# Patient Record
Sex: FEMALE | Race: WHITE | NOT HISPANIC OR LATINO | URBAN - METROPOLITAN AREA
[De-identification: names, ages, dates, MRNs, and addresses within clinical notes are randomized per-mention and may not be internally consistent; named-entity substitution may affect disease eponyms.]

---
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
## 2025-01-06 ENCOUNTER — HOSPITAL ENCOUNTER (OUTPATIENT)
Dept: RADIOLOGY | Facility: HOSPITAL | Age: 20
Discharge: HOME | End: 2025-01-06
Payer: COMMERCIAL

## 2025-01-06 ENCOUNTER — APPOINTMENT (OUTPATIENT)
Dept: PEDIATRICS | Facility: CLINIC | Age: 20
End: 2025-01-06
Payer: COMMERCIAL

## 2025-01-06 VITALS
OXYGEN SATURATION: 97 % | HEART RATE: 92 BPM | TEMPERATURE: 98.4 F | HEIGHT: 63 IN | DIASTOLIC BLOOD PRESSURE: 82 MMHG | SYSTOLIC BLOOD PRESSURE: 118 MMHG | BODY MASS INDEX: 25.23 KG/M2 | WEIGHT: 142.38 LBS

## 2025-01-06 DIAGNOSIS — F41.1 GENERALIZED ANXIETY DISORDER: ICD-10-CM

## 2025-01-06 DIAGNOSIS — F93.0 SEPARATION ANXIETY DISORDER: ICD-10-CM

## 2025-01-06 DIAGNOSIS — F41.1 GAD (GENERALIZED ANXIETY DISORDER): ICD-10-CM

## 2025-01-06 DIAGNOSIS — Z51.81 ENCOUNTER FOR MEDICATION MONITORING: ICD-10-CM

## 2025-01-06 DIAGNOSIS — F32.A DEPRESSIVE DISORDER: ICD-10-CM

## 2025-01-06 DIAGNOSIS — F41.1 GENERALIZED ANXIETY DISORDER WITH PANIC ATTACKS: ICD-10-CM

## 2025-01-06 DIAGNOSIS — M25.572 ACUTE LEFT ANKLE PAIN: Primary | ICD-10-CM

## 2025-01-06 DIAGNOSIS — F93.8 ANXIETY DISORDER OF ADOLESCENCE: ICD-10-CM

## 2025-01-06 DIAGNOSIS — F41.0 GENERALIZED ANXIETY DISORDER WITH PANIC ATTACKS: ICD-10-CM

## 2025-01-06 DIAGNOSIS — Z51.81 MEDICATION MONITORING ENCOUNTER: ICD-10-CM

## 2025-01-06 DIAGNOSIS — M85.679 BONE CYST OF ANKLE: ICD-10-CM

## 2025-01-06 DIAGNOSIS — Z76.89 ENCOUNTER FOR NEW MEDICATION PRESCRIPTION: ICD-10-CM

## 2025-01-06 DIAGNOSIS — M25.572 ACUTE LEFT ANKLE PAIN: ICD-10-CM

## 2025-01-06 PROCEDURE — 73610 X-RAY EXAM OF ANKLE: CPT | Mod: LT

## 2025-01-06 PROCEDURE — 73610 X-RAY EXAM OF ANKLE: CPT | Mod: LEFT SIDE | Performed by: RADIOLOGY

## 2025-01-06 PROCEDURE — 96127 BRIEF EMOTIONAL/BEHAV ASSMT: CPT | Performed by: PEDIATRICS

## 2025-01-06 PROCEDURE — 99214 OFFICE O/P EST MOD 30 MIN: CPT | Performed by: PEDIATRICS

## 2025-01-06 PROCEDURE — 3008F BODY MASS INDEX DOCD: CPT | Performed by: PEDIATRICS

## 2025-01-06 RX ORDER — ESCITALOPRAM OXALATE 5 MG/1
5 TABLET ORAL DAILY
Qty: 90 TABLET | Refills: 1 | Status: SHIPPED | OUTPATIENT
Start: 2025-01-06 | End: 2025-07-05

## 2025-01-06 NOTE — PROGRESS NOTES
Subjective   Patient ID: Naomy Sutton is a 19 y.o. female who presents for Well Child (Patient is here for 19 year old well child concerns for left ankle pain and no concerns for anxiety.)    HPI      HERE FOR FOLLOW UP FOR ANXIETY , concern for left ankle pain   -previous diagnosis by other providers for: General Anxiety Disorder, Adolescent Depression, Eating disorder, Self cutting behaviors (last done 2020)   -2/28/2023: 1st diagnosis in office by me; medication for anxiety started  -1/5/2024: was on lexapro 10 mg with counseling  -7/5/2024: patient desired to wean off medication = decreased to 5 mg for 1 month  -8/30/2024: telehealth visit: did not tolerate being off medication for 20 days = patient decided to restart lexapro 5 mg   -9/27/2024: telehealth follow up: improved on lexapro 5 mg    Since last seen, no counseling  Sleep is good  Appetite is unchanged.   When home, has more steady.     Anxiety:   Some breakthrough episodes  Friend group, lost friends.   Old roommate not now; 2 roommates, still present in home.   Other room mate she gets along .  No change with relationship     Triggers: when others stress, will stress  Will go on walks at least 3 days/week   No other substance abuse    Happy with current dose.   Change in birth control due to uncontrolled periods.  Was on progesterone only, now on high dose progrestone with low dose estrogen with iron.     Plan to continue until summer.       2. Ankle pain for over 1 year   1 year during running started and twisted same ankle  Patient was not evaluated by clinic or with xray   Since then, pain comes and goes but pretty consistent  Friend of family who is physical therapist recommended she be evaluated for possible displaced talus     Few months ago with walking, twisted ankle and rolled inward  Pain all the time, attempting stretching with band with minimal improvement.   Some bruising when walking excessively.   Pain dailly   Pain on top of  "ankle to lateral malleolus, pain worse with weight bearing.   No physical therapy yet.   No weakness or numbness                          Review of Systems    Vitals:    01/06/25 1304   BP: 118/82   Pulse: 92   Temp: 36.9 °C (98.4 °F)   SpO2: 97%   Weight: 64.6 kg (142 lb 6 oz)   Height: 1.6 m (5' 3\")       Objective   Physical Exam  Vitals and nursing note reviewed. Exam conducted with a chaperone present.   Constitutional:       General: She is not in acute distress.     Appearance: Normal appearance. She is well-developed. She is not toxic-appearing.   HENT:      Head: Normocephalic and atraumatic.      Right Ear: Tympanic membrane and external ear normal.      Left Ear: Tympanic membrane and external ear normal.      Nose: Nose normal.      Mouth/Throat:      Mouth: Mucous membranes are moist.      Pharynx: Oropharynx is clear. No oropharyngeal exudate or posterior oropharyngeal erythema.      Tonsils: No tonsillar exudate.   Eyes:      Extraocular Movements: Extraocular movements intact.      Conjunctiva/sclera: Conjunctivae normal.   Cardiovascular:      Rate and Rhythm: Normal rate and regular rhythm.      Pulses: Normal pulses.      Heart sounds: Normal heart sounds. No murmur heard.  Pulmonary:      Effort: Pulmonary effort is normal.      Breath sounds: Normal breath sounds.   Abdominal:      General: Abdomen is flat. Bowel sounds are normal.      Palpations: Abdomen is soft.   Musculoskeletal:      Cervical back: Neck supple.      Right knee: Normal.      Left knee: Normal.      Right lower leg: Normal. No swelling. No edema.      Left lower leg: Normal. No swelling. No edema.      Right ankle: Normal.      Right Achilles Tendon: Normal.      Left ankle: No swelling, deformity or ecchymosis. Tenderness present. Normal range of motion.      Left Achilles Tendon: Normal.      Comments: Tenderness on left lateral malleolus and just behind malleolus    Lymphadenopathy:      Cervical: No cervical adenopathy. "   Skin:     General: Skin is warm and dry.      Findings: No rash.   Neurological:      Mental Status: She is alert. Mental status is at baseline.   Psychiatric:         Mood and Affect: Mood normal. Mood is not depressed. Affect is not flat.         Speech: Speech normal.         Behavior: Behavior normal. Behavior is cooperative.                  Assessment/Plan   Problem List Items Addressed This Visit       Anxiety disorder of adolescence    Relevant Medications    escitalopram (Lexapro) 5 mg tablet    ARVIND (generalized anxiety disorder)    Relevant Medications    escitalopram (Lexapro) 5 mg tablet    Depressive disorder    Relevant Medications    escitalopram (Lexapro) 5 mg tablet    Generalized anxiety disorder    Relevant Medications    escitalopram (Lexapro) 5 mg tablet     Other Visit Diagnoses       Acute left ankle pain    -  Primary    Relevant Orders    XR ankle left 3+ views    Encounter for medication monitoring        Relevant Medications    escitalopram (Lexapro) 5 mg tablet    Generalized anxiety disorder with panic attacks        Relevant Medications    escitalopram (Lexapro) 5 mg tablet    Separation anxiety disorder        Relevant Medications    escitalopram (Lexapro) 5 mg tablet    Medication monitoring encounter        Relevant Medications    escitalopram (Lexapro) 5 mg tablet    Encounter for new medication prescription        Relevant Medications    escitalopram (Lexapro) 5 mg tablet              Current Outpatient Medications:     escitalopram (Lexapro) 5 mg tablet, Take 1 tablet (5 mg) by mouth once daily., Disp: 30 tablet, Rfl: 0    escitalopram (Lexapro) 5 mg tablet, Take 1 tablet (5 mg) by mouth once daily., Disp: 90 tablet, Rfl: 1    hydrOXYzine pamoate (Vistaril) 25 mg capsule, Take 1-2 tablets up to twice  a day as needed for anxiety, Disp: 30 capsule, Rfl: 1      MDM  General anxiety and depression controlled on escitalopram 5 mg, failed trial off medication     PHQ-A: see scanned  form; Total 3; A/P: low risk, no referrals      Anxiety screen : see scanned scored form completed by patient  Panic disorder score(7+ is positive):  Patient 3  General Anxiety Disorder score(9+ is positive): Patient 9  Separation Anxeity Disorder score(5+ is positive):  Patient 0  Social Anxiety Disorder score(8+ is positive):  Patient 2  Significant School Avoidance score(25+ is positive):  Patient 0  Total: Patient 14     Interpretation Guidelines:   A total score of >25 may indicate the presence of Anxiety Disorder. Scores higher than 30 are more specific.     Assessment: scores consistent with controlled Anxiety   Depression score mild depression   No suicidal risks or ideations   No other risk factors       Counseling: none  Reviewed anxiety and depression diagnosis, course, importance of taking medication daily  Refill rx; escitalopram 5 mg, #90 supply sent to mail order, rf 1   Patient has hydroxyzine 25 mg prn break through anxiety   Follow up in summer  Plan: trial to slowly wean off medication.   Return prn any worse    2. Left ankle pain intermittently x 1 year with new injury   Discussed suspected diagnosis, treatment, course with patient  Given time of pain, recommend imaging to further evaluate  If normal:   Supportive care: RICE, rest, ice to area 10 min intervals, nsaids prn pain, compressive dressing for support  Return prn any worse   Consider physical therapy if xray normal     Alexandra Faye MD

## 2025-01-10 NOTE — PROGRESS NOTES
XRAY reviewed   Impression 5 mm cystic bone lesion with sclerotic rim in the distal diaphysis of fibula, suspect incidental bone cyst     Assessment and plan   Message to be sent to patient and parent regarding results  Recommend even if benign bone cyst, recommend follow up with Peds Ortho since patient with chronic ankle pain.     Alexandra Faye MD

## 2025-02-21 DIAGNOSIS — F41.1 GENERALIZED ANXIETY DISORDER WITH PANIC ATTACKS: ICD-10-CM

## 2025-02-21 DIAGNOSIS — Z76.89 ENCOUNTER FOR NEW MEDICATION PRESCRIPTION: ICD-10-CM

## 2025-02-21 DIAGNOSIS — F32.A DEPRESSIVE DISORDER: ICD-10-CM

## 2025-02-21 DIAGNOSIS — F93.0 SEPARATION ANXIETY DISORDER: ICD-10-CM

## 2025-02-21 DIAGNOSIS — Z51.81 MEDICATION MONITORING ENCOUNTER: ICD-10-CM

## 2025-02-21 DIAGNOSIS — F93.8 ANXIETY DISORDER OF ADOLESCENCE: ICD-10-CM

## 2025-02-21 DIAGNOSIS — Z51.81 ENCOUNTER FOR MEDICATION MONITORING: ICD-10-CM

## 2025-02-21 DIAGNOSIS — F41.1 GENERALIZED ANXIETY DISORDER: ICD-10-CM

## 2025-02-21 DIAGNOSIS — F41.0 GENERALIZED ANXIETY DISORDER WITH PANIC ATTACKS: ICD-10-CM

## 2025-02-21 DIAGNOSIS — F41.1 GAD (GENERALIZED ANXIETY DISORDER): ICD-10-CM

## 2025-02-21 NOTE — TELEPHONE ENCOUNTER
SPOKE TO ANNETTE [facility] PLEASE ADVISE, THANK YOU.

## 2025-02-24 ENCOUNTER — TELEPHONE (OUTPATIENT)
Dept: PEDIATRICS | Facility: CLINIC | Age: 20
End: 2025-02-24
Payer: COMMERCIAL

## 2025-02-24 RX ORDER — ESCITALOPRAM OXALATE 5 MG/1
5 TABLET ORAL DAILY
Qty: 90 TABLET | Refills: 1 | Status: SHIPPED | OUTPATIENT
Start: 2025-02-24 | End: 2025-08-23

## 2025-02-24 NOTE — TELEPHONE ENCOUNTER
Lexapro 5mg, 90 day refill.  Sent in for mail order, looks like it wasn't approved yet by [facility], she is almost out.    Please check on this.

## 2025-05-08 ENCOUNTER — PATIENT MESSAGE (OUTPATIENT)
Dept: PEDIATRICS | Facility: CLINIC | Age: 20
End: 2025-05-08
Payer: COMMERCIAL

## 2025-05-09 ENCOUNTER — OFFICE VISIT (OUTPATIENT)
Dept: PEDIATRICS | Facility: CLINIC | Age: 20
End: 2025-05-09
Payer: COMMERCIAL

## 2025-05-09 VITALS
HEART RATE: 95 BPM | WEIGHT: 152.2 LBS | RESPIRATION RATE: 18 BRPM | DIASTOLIC BLOOD PRESSURE: 72 MMHG | SYSTOLIC BLOOD PRESSURE: 100 MMHG | OXYGEN SATURATION: 98 % | HEIGHT: 63 IN | TEMPERATURE: 97.5 F | BODY MASS INDEX: 26.97 KG/M2

## 2025-05-09 DIAGNOSIS — Z11.3 ROUTINE SCREENING FOR STI (SEXUALLY TRANSMITTED INFECTION): ICD-10-CM

## 2025-05-09 DIAGNOSIS — Z76.89 ENCOUNTER FOR NEW MEDICATION PRESCRIPTION: ICD-10-CM

## 2025-05-09 DIAGNOSIS — F41.0 GENERALIZED ANXIETY DISORDER WITH PANIC ATTACKS: Primary | ICD-10-CM

## 2025-05-09 DIAGNOSIS — Z51.81 ENCOUNTER FOR MEDICATION MONITORING: ICD-10-CM

## 2025-05-09 DIAGNOSIS — R31.9 HEMATURIA, UNSPECIFIED TYPE: ICD-10-CM

## 2025-05-09 DIAGNOSIS — R30.0 DYSURIA: ICD-10-CM

## 2025-05-09 DIAGNOSIS — F93.0 SEPARATION ANXIETY DISORDER: ICD-10-CM

## 2025-05-09 DIAGNOSIS — F32.A DEPRESSIVE DISORDER: ICD-10-CM

## 2025-05-09 DIAGNOSIS — Z51.81 MEDICATION MONITORING ENCOUNTER: ICD-10-CM

## 2025-05-09 DIAGNOSIS — F41.1 GENERALIZED ANXIETY DISORDER WITH PANIC ATTACKS: Primary | ICD-10-CM

## 2025-05-09 DIAGNOSIS — F41.1 GAD (GENERALIZED ANXIETY DISORDER): ICD-10-CM

## 2025-05-09 DIAGNOSIS — F93.8 ANXIETY DISORDER OF ADOLESCENCE: ICD-10-CM

## 2025-05-09 DIAGNOSIS — F41.1 GENERALIZED ANXIETY DISORDER: ICD-10-CM

## 2025-05-09 LAB
POC APPEARANCE, URINE: CLEAR
POC BILIRUBIN, URINE: NEGATIVE
POC BLOOD, URINE: NEGATIVE
POC COLOR, URINE: YELLOW
POC GLUCOSE, URINE: NEGATIVE MG/DL
POC KETONES, URINE: NEGATIVE MG/DL
POC LEUKOCYTES, URINE: NEGATIVE
POC NITRITE,URINE: NEGATIVE
POC PH, URINE: 6 PH
POC PROTEIN, URINE: NEGATIVE MG/DL
POC SPECIFIC GRAVITY, URINE: 1.02
POC UROBILINOGEN, URINE: 0.2 EU/DL
PREGNANCY TEST URINE, POC: NEGATIVE

## 2025-05-09 PROCEDURE — 81025 URINE PREGNANCY TEST: CPT | Performed by: PEDIATRICS

## 2025-05-09 PROCEDURE — 81003 URINALYSIS AUTO W/O SCOPE: CPT | Performed by: PEDIATRICS

## 2025-05-09 PROCEDURE — 99214 OFFICE O/P EST MOD 30 MIN: CPT | Performed by: PEDIATRICS

## 2025-05-09 PROCEDURE — 3008F BODY MASS INDEX DOCD: CPT | Performed by: PEDIATRICS

## 2025-05-09 RX ORDER — ESCITALOPRAM OXALATE 5 MG/1
5 TABLET ORAL DAILY
Qty: 90 TABLET | Refills: 1 | Status: SHIPPED | OUTPATIENT
Start: 2025-05-09 | End: 2025-11-05

## 2025-05-09 NOTE — PROGRESS NOTES
Subjective   Patient ID: Naomy Sutton is a 19 y.o. female who presents for Difficulty Urinating (Patient here with self for discomfort with urination and some bleeding. Not currently on period. Since yesterday)    HPI    Here for concern for possible uti; possible follow up for anxiety   Pain with urination  Blood in urine    Patient not sure if this is vaginal bleeding or blood in urine   Wiping after urination with blood on toilet paper  Some dark blood noted on underwear  No known injury   Lmp 3 months, on ocp;  last placebo 2 weeks ago; on pills x 4 months   She is not due for period during this time    No diarrhea, no constipation   No fevers  No nausea   No vomiting   No abd pain  No back pain    Patient sexually active, on ocp, last episode week, condom use   No pain with sex       2. HERE FOR FOLLOW UP FOR GENERAL ANXIETY WITH PANIC ATTACKS, DEPRESSION   -previous diagnosis by other providers for: General Anxiety Disorder, Adolescent Depression, Eating disorder, Self cutting behaviors (last done 2020)   -2/28/2023: 1st diagnosis in office by me; medication for anxiety started  -1/5/2024: was on lexapro 10 mg with counseling  -7/5/2024: patient desired to wean off medication = decreased to 5 mg for 1 month  -8/30/2024: telehealth visit: did not tolerate being off medication for 20 days = patient decided to restart lexapro 5 mg   -9/27/2024: telehealth follow up: improved on lexapro 5 mg    Last seen in office Jan 6, 2025     At that time:   Assessment: scores consistent with controlled Anxiety   Depression score mild depression   No suicidal risks or ideations   No other risk factors   Counseling: none  Reviewed anxiety and depression diagnosis, course, importance of taking medication daily  Refill rx; escitalopram 5 mg, #90 supply sent to mail order, rf 1   Patient has hydroxyzine 25 mg prn break through anxiety   Follow up in summer  Plan: trial to slowly wean off medication.   Return prn any  "worse    Since last seen:   Anxiety and depression under control.   Since last seen, having less panic attacks.   Intermittently with some anxiety and difficulty sleeping, will take hydroxyzine 2 pills prior to bed    Home from college until end of August       Triggers for anxiety: friend group issues,   School is steady is fine and not stressful  Starts school last week of August Fall break and winter break       Activity: Walking       Meds:   Escitalopram 5 mg   Hydroxyzine 25 mg, 1-2 tablets at bedtime prn                          Review of Systems    Vitals:    05/09/25 1151   BP: 100/72   Pulse: 95   Resp: 18   Temp: 36.4 °C (97.5 °F)   SpO2: 98%   Weight: 69 kg (152 lb 3.2 oz)   Height: 1.6 m (5' 3\")       Objective   Physical Exam  Vitals and nursing note reviewed.   Constitutional:       General: She is not in acute distress.     Appearance: Normal appearance. She is well-developed. She is not toxic-appearing.   HENT:      Head: Normocephalic and atraumatic.      Right Ear: Tympanic membrane and external ear normal.      Left Ear: Tympanic membrane and external ear normal.      Nose: Nose normal.      Mouth/Throat:      Mouth: Mucous membranes are moist.      Pharynx: Oropharynx is clear. No oropharyngeal exudate or posterior oropharyngeal erythema.      Tonsils: No tonsillar exudate.   Eyes:      Extraocular Movements: Extraocular movements intact.      Conjunctiva/sclera: Conjunctivae normal.   Cardiovascular:      Rate and Rhythm: Normal rate and regular rhythm.      Pulses: Normal pulses.      Heart sounds: Normal heart sounds. No murmur heard.  Pulmonary:      Effort: Pulmonary effort is normal.      Breath sounds: Normal breath sounds.   Abdominal:      General: Abdomen is flat. Bowel sounds are normal.      Palpations: Abdomen is soft.      Hernia: There is no hernia in the left inguinal area or right inguinal area.   Genitourinary:     General: Normal vulva.      Exam position: Supine.      " Comments: Small amount of blood on underwear, no active bleeding within cervical os, no external labial/vulvar bleeding noted   Musculoskeletal:      Cervical back: Neck supple.   Lymphadenopathy:      Cervical: No cervical adenopathy.   Skin:     General: Skin is warm and dry.      Findings: No rash.   Neurological:      Mental Status: She is alert. Mental status is at baseline.   Psychiatric:         Attention and Perception: Attention normal.         Mood and Affect: Mood normal. Mood is not anxious or depressed.         Speech: Speech normal.         Behavior: Behavior normal. Behavior is cooperative.         Thought Content: Thought content normal.         Cognition and Memory: Cognition normal.         Judgment: Judgment normal.              Labs  Poct ua reviewed, neg nit/leuk est  Poct urine hcg neg   Urine sent for ua and culture       Assessment/Plan   Problem List Items Addressed This Visit       Anxiety disorder of adolescence    ARVIND (generalized anxiety disorder)    Depressive disorder    Relevant Medications    escitalopram (Lexapro) 5 mg tablet    Generalized anxiety disorder     Other Visit Diagnoses         Generalized anxiety disorder with panic attacks    -  Primary    Relevant Medications    escitalopram (Lexapro) 5 mg tablet      Dysuria        Relevant Orders    POCT UA Automated manually resulted (Completed)    Urine Culture    Urinalysis with Reflex Microscopic    POCT urine pregnancy (Completed)    Urinalysis with Reflex Microscopic      Encounter for new medication prescription          Encounter for medication monitoring        Relevant Medications    escitalopram (Lexapro) 5 mg tablet      Separation anxiety disorder        Relevant Medications    escitalopram (Lexapro) 5 mg tablet      Medication monitoring encounter        Relevant Medications    escitalopram (Lexapro) 5 mg tablet      Hematuria, unspecified type        Relevant Orders    POCT UA Automated manually resulted (Completed)     Urine Culture    Urinalysis with Reflex Microscopic    C. trachomatis / N. gonorrhoeae, Amplified, Urogenital      Routine screening for STI (sexually transmitted infection)        Relevant Orders    C. trachomatis / N. gonorrhoeae, Amplified, Urogenital            Current Medications[1]      MDM  Dysuria and hematuria r/o uti   Discussed suspected diagnosis, course, need to further evaluate with parent/guardian.   Reviewed proper vulvar care  Avoid bubble baths   Treat any constipation prn   Continue to monitor urinary frequency  Urine to be sent for ua and culture.   Message to be sent to patient with results and recommendations as needed by Monday   Advised need to check for pregnancy and sti given irregular bleeding     2. General Anxiety Disorder and depression follow up   Controlled on current medication   Patient plans to continue during summer  Refill rx: escitalopram 5 mg every day    Patient requests 90 day supply sent to mail order   [place] at end of August, back during fall and winter break   Counseling: none   Follow up during Fall break, sooner worse    Alexandra Faye MD           [1]   Current Outpatient Medications:     escitalopram (Lexapro) 5 mg tablet, Take 1 tablet (5 mg) by mouth once daily., Disp: 90 tablet, Rfl: 1    hydrOXYzine pamoate (Vistaril) 25 mg capsule, Take 1-2 tablets up to twice  a day as needed for anxiety, Disp: 30 capsule, Rfl: 1     No

## 2025-05-10 LAB
APPEARANCE UR: CLEAR
BACTERIA #/AREA URNS HPF: ABNORMAL /HPF
BILIRUB UR QL STRIP: NEGATIVE
C TRACH RRNA SPEC QL NAA+PROBE: NOT DETECTED
COLOR UR: YELLOW
GLUCOSE UR QL STRIP: NEGATIVE
HGB UR QL STRIP: ABNORMAL
HYALINE CASTS #/AREA URNS LPF: ABNORMAL /LPF
KETONES UR QL STRIP: NEGATIVE
LEUKOCYTE ESTERASE UR QL STRIP: ABNORMAL
N GONORRHOEA RRNA SPEC QL NAA+PROBE: NOT DETECTED
NITRITE UR QL STRIP: NEGATIVE
PH UR STRIP: 6 [PH] (ref 5–8)
PROT UR QL STRIP: NEGATIVE
QUEST GC CT AMPLIFIED (ALWAYS MESSAGE): NORMAL
RBC #/AREA URNS HPF: ABNORMAL /HPF
SERVICE CMNT-IMP: ABNORMAL
SP GR UR STRIP: 1.02 (ref 1–1.03)
SQUAMOUS #/AREA URNS HPF: ABNORMAL /HPF
WBC #/AREA URNS HPF: ABNORMAL /HPF

## 2025-05-11 LAB — BACTERIA UR CULT: ABNORMAL

## 2025-05-12 ENCOUNTER — TELEPHONE (OUTPATIENT)
Dept: PEDIATRICS | Facility: CLINIC | Age: 20
End: 2025-05-12
Payer: COMMERCIAL

## 2025-05-12 NOTE — TELEPHONE ENCOUNTER
Naomy called stated she did see her urine culture results and it showed positive for UTI, [place] is asking for an ABX to be sent today, she is very uncomfortable and is leaving to go out of town [facility]. Please send to [facility] in [place].

## 2025-05-23 ENCOUNTER — OFFICE VISIT (OUTPATIENT)
Dept: URGENT CARE | Age: 20
End: 2025-05-23
Payer: COMMERCIAL

## 2025-05-23 VITALS
TEMPERATURE: 98.7 F | WEIGHT: 152 LBS | SYSTOLIC BLOOD PRESSURE: 123 MMHG | HEIGHT: 63 IN | OXYGEN SATURATION: 99 % | HEART RATE: 102 BPM | RESPIRATION RATE: 18 BRPM | DIASTOLIC BLOOD PRESSURE: 76 MMHG | BODY MASS INDEX: 26.93 KG/M2

## 2025-05-23 DIAGNOSIS — T78.40XA ALLERGIC REACTION TO DRUG, INITIAL ENCOUNTER: Primary | ICD-10-CM

## 2025-05-23 RX ORDER — PREDNISONE 20 MG/1
20 TABLET ORAL 2 TIMES DAILY
Qty: 10 TABLET | Refills: 0 | Status: SHIPPED | OUTPATIENT
Start: 2025-05-23 | End: 2025-05-28

## 2025-05-23 RX ORDER — PREDNISONE 20 MG/1
20 TABLET ORAL 2 TIMES DAILY
Qty: 10 TABLET | Refills: 0 | Status: SHIPPED | OUTPATIENT
Start: 2025-05-23 | End: 2025-05-23 | Stop reason: ALTCHOICE

## 2025-05-23 RX ORDER — METHYLPREDNISOLONE SODIUM SUCCINATE 125 MG/2ML
125 INJECTION INTRAMUSCULAR; INTRAVENOUS ONCE
Status: COMPLETED | OUTPATIENT
Start: 2025-05-23 | End: 2025-05-23

## 2025-05-23 RX ADMIN — METHYLPREDNISOLONE SODIUM SUCCINATE 125 MG: 125 INJECTION INTRAMUSCULAR; INTRAVENOUS at 20:00

## 2025-05-23 ASSESSMENT — PATIENT HEALTH QUESTIONNAIRE - PHQ9
1. LITTLE INTEREST OR PLEASURE IN DOING THINGS: NOT AT ALL
2. FEELING DOWN, DEPRESSED OR HOPELESS: NOT AT ALL
SUM OF ALL RESPONSES TO PHQ9 QUESTIONS 1 & 2: 0

## 2025-05-23 ASSESSMENT — PAIN SCALES - GENERAL: PAINLEVEL_OUTOF10: 0-NO PAIN

## 2025-05-24 NOTE — PROGRESS NOTES
"Subjective   Patient ID: Naomy Sutton is a 19 y.o. female who presents for Allergic Reaction (Hives, swollen lymph nodes /Started today /Possibly bactrim ).  HPI  Patient presents for allergic reaction.  Patient was finishing up prescription of Bactrim and reports rapid onset redness, hives, and flushing just prior to arrival.  No attempted conservative management.  No angioedema or dyspnea on arrival.  No other complaints.    Review of Systems    Constitutional:  See HPI    Integumentary: See HPI  Neurologic:  Alert and oriented X4, No numbness, No tingling.    All other systems are negative     Objective     /76 (BP Location: Right arm, Patient Position: Sitting)   Pulse 102   Temp 37.1 °C (98.7 °F)   Resp 18   Ht 1.6 m (5' 3\")   Wt 68.9 kg (152 lb)   LMP 05/23/2025 (Approximate)   SpO2 99%   BMI 26.93 kg/m²     Physical Exam    General:  Alert and oriented, No acute distress.    Eye:  Pupils are equal, round and reactive to light, Normal conjunctiva.    HENT:  Normocephalic,   Neck:  Supple    Respiratory: Respirations are non-labored   Musculoskeletal: Normal ROM and strength  Integumentary: Widespread [place] area on the trunk and upper extremities; there is widespread flushing as well.    Neurologic:  Alert, Oriented, Normal sensory, Cranial Nerves II-XII are grossly intact  Psychiatric:  Cooperative, Appropriate mood & affect.    Assessment/Plan   When the patient arrived to the clinic she was immediately taken back to the procedure room and intramuscular Solu-Medrol 125 mg was administered by me.  I stayed in the room for about 5 to 10 minutes and the patient did well.  Wrote for prescription of prednisone for the patient to take home.  They will add an antihistamine at that time.  Recommend keeping threshold for ED visit low after leaving here.  Patient's clinical presentation is otherwise unremarkable at this time. Patient is discharged with instructions to follow-up with primary care or " seek emergency medical attention for worsening symptoms or any new concerns.  Problem List Items Addressed This Visit    None  Visit Diagnoses         Allergic reaction to drug, initial encounter    -  Primary    Relevant Medications    predniSONE (Deltasone) 20 mg tablet            Final diagnoses:   [T78.40XA] Allergic reaction to drug, initial encounter

## 2025-05-26 DIAGNOSIS — Z51.81 ENCOUNTER FOR MEDICATION MONITORING: ICD-10-CM

## 2025-05-26 DIAGNOSIS — F41.0 GENERALIZED ANXIETY DISORDER WITH PANIC ATTACKS: ICD-10-CM

## 2025-05-26 DIAGNOSIS — F93.0 SEPARATION ANXIETY DISORDER: ICD-10-CM

## 2025-05-26 DIAGNOSIS — F32.A DEPRESSIVE DISORDER: ICD-10-CM

## 2025-05-26 DIAGNOSIS — F41.1 GENERALIZED ANXIETY DISORDER WITH PANIC ATTACKS: ICD-10-CM

## 2025-05-26 DIAGNOSIS — Z51.81 MEDICATION MONITORING ENCOUNTER: ICD-10-CM

## 2025-05-26 RX ORDER — ESCITALOPRAM OXALATE 5 MG/1
5 TABLET ORAL DAILY
Qty: 30 TABLET | Refills: 0 | Status: SHIPPED | OUTPATIENT
Start: 2025-05-26

## 2025-05-26 NOTE — PROGRESS NOTES
Spoke w/pt via phone.  Online pharmacy sent 90d rx to wrong address too far away for pt to get.  Has been on phone w/online pharmacy who told pt would send to close pharmacy but not sent yet.  Pt leaving to go out of town & needs rx.  30d rx for lexapro 5mg sent to [facility].  D/w pt may need to pay out of pocket if insurance saying too early to cover.

## 2025-06-18 DIAGNOSIS — F32.A DEPRESSIVE DISORDER: ICD-10-CM

## 2025-06-18 DIAGNOSIS — F41.0 GENERALIZED ANXIETY DISORDER WITH PANIC ATTACKS: ICD-10-CM

## 2025-06-18 DIAGNOSIS — Z51.81 ENCOUNTER FOR MEDICATION MONITORING: ICD-10-CM

## 2025-06-18 DIAGNOSIS — F93.0 SEPARATION ANXIETY DISORDER: ICD-10-CM

## 2025-06-18 DIAGNOSIS — Z51.81 MEDICATION MONITORING ENCOUNTER: ICD-10-CM

## 2025-06-18 DIAGNOSIS — F41.1 GENERALIZED ANXIETY DISORDER WITH PANIC ATTACKS: ICD-10-CM

## 2025-06-19 RX ORDER — ESCITALOPRAM OXALATE 5 MG/1
5 TABLET ORAL DAILY
Qty: 90 TABLET | Refills: 1 | Status: SHIPPED | OUTPATIENT
Start: 2025-06-19

## 2025-07-07 ENCOUNTER — APPOINTMENT (OUTPATIENT)
Dept: PEDIATRICS | Facility: CLINIC | Age: 20
End: 2025-07-07
Payer: COMMERCIAL

## 2025-07-30 ENCOUNTER — RESULTS FOLLOW-UP (OUTPATIENT)
Dept: PEDIATRICS | Facility: CLINIC | Age: 20
End: 2025-07-30

## 2025-07-30 ENCOUNTER — OFFICE VISIT (OUTPATIENT)
Dept: PEDIATRICS | Facility: CLINIC | Age: 20
End: 2025-07-30
Payer: COMMERCIAL

## 2025-07-30 VITALS
WEIGHT: 145.8 LBS | TEMPERATURE: 97.7 F | SYSTOLIC BLOOD PRESSURE: 110 MMHG | RESPIRATION RATE: 18 BRPM | HEIGHT: 63 IN | DIASTOLIC BLOOD PRESSURE: 66 MMHG | HEART RATE: 88 BPM | OXYGEN SATURATION: 96 % | BODY MASS INDEX: 25.83 KG/M2

## 2025-07-30 DIAGNOSIS — R30.0 DYSURIA: Primary | ICD-10-CM

## 2025-07-30 LAB
POC APPEARANCE, URINE: CLEAR
POC BILIRUBIN, URINE: NEGATIVE
POC BLOOD, URINE: NEGATIVE
POC COLOR, URINE: YELLOW
POC GLUCOSE, URINE: NEGATIVE MG/DL
POC KETONES, URINE: NEGATIVE MG/DL
POC LEUKOCYTES, URINE: ABNORMAL
POC NITRITE,URINE: NEGATIVE
POC PH, URINE: 8 PH
POC PROTEIN, URINE: NEGATIVE MG/DL
POC SPECIFIC GRAVITY, URINE: 1.02
POC UROBILINOGEN, URINE: 0.2 EU/DL

## 2025-07-30 PROCEDURE — 99214 OFFICE O/P EST MOD 30 MIN: CPT | Performed by: PEDIATRICS

## 2025-07-30 PROCEDURE — 81003 URINALYSIS AUTO W/O SCOPE: CPT | Performed by: PEDIATRICS

## 2025-07-30 PROCEDURE — 3008F BODY MASS INDEX DOCD: CPT | Performed by: PEDIATRICS

## 2025-07-30 RX ORDER — CEPHALEXIN 500 MG/1
500 CAPSULE ORAL 2 TIMES DAILY
Qty: 20 CAPSULE | Refills: 0 | Status: SHIPPED | OUTPATIENT
Start: 2025-07-30 | End: 2025-08-09

## 2025-07-30 NOTE — PROGRESS NOTES
"Subjective   Patient ID: Naomy Sutton is a 20 y.o. female who presents for Sick Visit (Patient here with self for pain with urination. Also felt sore yesterday. Took otc off brand of azo with some relief. [place] [facility]. )    HPI  HERE UNACCOMPANIED   Here for concern for possible uti.   H/o uti, last in May 9, 2025 E. Coli 10-49,000 cfu pansensitive to all antibiotics  Patient had reaction on day 10 of tmp-smx     Symptoms started yesterday morning, took azo 8 pills   Symptoms with some pain but less than usual   Yesterday with dysuria  Urinary urgency   Took bactrim, day 10 of medications, had hives     No fevers  No abdominal pain   No nausea or vomiting     Patient sexually active, last this weekend  Swam in [place] this weekend     Review of Systems    Vitals:    07/30/25 1022   BP: 110/66   Pulse: 88   Resp: 18   Temp: 36.5 °C (97.7 °F)   SpO2: 96%   Weight: 66.1 kg (145 lb 12.8 oz)   Height: 1.6 m (5' 3\")       Objective   Physical Exam  Vitals and nursing note reviewed.   Constitutional:       General: She is not in acute distress.     Appearance: Normal appearance. She is well-developed. She is not toxic-appearing.   HENT:      Head: Normocephalic and atraumatic.      Right Ear: Tympanic membrane and external ear normal.      Left Ear: Tympanic membrane and external ear normal.      Nose: Nose normal.      Mouth/Throat:      Mouth: Mucous membranes are moist.      Pharynx: Oropharynx is clear. No oropharyngeal exudate or posterior oropharyngeal erythema.      Tonsils: No tonsillar exudate.     Eyes:      Extraocular Movements: Extraocular movements intact.      Conjunctiva/sclera: Conjunctivae normal.       Cardiovascular:      Rate and Rhythm: Normal rate and regular rhythm.      Pulses: Normal pulses.      Heart sounds: Normal heart sounds. No murmur heard.  Pulmonary:      Effort: Pulmonary effort is normal.      Breath sounds: Normal breath sounds.   Abdominal:      General: Abdomen " is flat. Bowel sounds are normal.      Palpations: Abdomen is soft.      Tenderness: There is no abdominal tenderness. There is no right CVA tenderness or left CVA tenderness.     Musculoskeletal:      Cervical back: Neck supple.   Lymphadenopathy:      Cervical: No cervical adenopathy.     Skin:     General: Skin is warm and dry.      Findings: No rash.     Neurological:      Mental Status: She is alert. Mental status is at baseline.              Labs  Poct ua reviewed  Urine sent for ua with microscopy and culture       Assessment/Plan   Problem List Items Addressed This Visit    None  Visit Diagnoses         Dysuria    -  Primary    Relevant Medications    cephalexin (Keflex) 500 mg capsule    Other Relevant Orders    POCT UA Automated manually resulted (Completed)    Urine Culture    Urinalysis with Reflex Microscopic            Current Medications[1]        MDM  Dysuria x 1 day without fever  Ua with leuk est otherwise neg   Urine sent for ua and culture  Advised patient if able to hold off on antibiotics until urine culture back to confirm, advise holding off for now  Rx for keflex 500 mg bid x 10 days = if pain worsens, ok to start until culture is back   Continue azo prn for pain   Encourage fluids  Results and recommendations to be sent by mychart  Return prn     Alexandra Faye MD           [1]   Current Outpatient Medications:     cephalexin (Keflex) 500 mg capsule, Take 1 capsule (500 mg) by mouth 2 times a day for 10 days., Disp: 20 capsule, Rfl: 0    escitalopram (Lexapro) 5 mg tablet, TAKE 1 TABLET BY MOUTH EVERY DAY, Disp: 90 tablet, Rfl: 1    hydrOXYzine pamoate (Vistaril) 25 mg capsule, Take 1-2 tablets up to twice  a day as needed for anxiety, Disp: 30 capsule, Rfl: 1

## 2025-08-01 LAB
APPEARANCE UR: ABNORMAL
BACTERIA #/AREA URNS HPF: ABNORMAL /HPF
BACTERIA UR CULT: ABNORMAL
BILIRUB UR QL STRIP: NEGATIVE
COLOR UR: YELLOW
GLUCOSE UR QL STRIP: NEGATIVE
HGB UR QL STRIP: NEGATIVE
HYALINE CASTS #/AREA URNS LPF: ABNORMAL /LPF
KETONES UR QL STRIP: NEGATIVE
LEUKOCYTE ESTERASE UR QL STRIP: ABNORMAL
NITRITE UR QL STRIP: NEGATIVE
PH UR STRIP: 8 [PH] (ref 5–8)
PROT UR QL STRIP: ABNORMAL
RBC #/AREA URNS HPF: ABNORMAL /HPF
SERVICE CMNT-IMP: ABNORMAL
SP GR UR STRIP: 1.01 (ref 1–1.03)
SQUAMOUS #/AREA URNS HPF: ABNORMAL /HPF
WBC #/AREA URNS HPF: ABNORMAL /HPF

## 2025-11-26 ENCOUNTER — APPOINTMENT (OUTPATIENT)
Dept: PEDIATRICS | Facility: CLINIC | Age: 20
End: 2025-11-26
Payer: COMMERCIAL